# Patient Record
(demographics unavailable — no encounter records)

---

## 2025-06-09 NOTE — ASSESSMENT
[FreeTextEntry1] : Will renew his prescription for Atrovent. Also recommend an office submucosal resection of his inferior turbinate, out fracturing of turbinate and radiofrequency ablation of the bilateral nasal septal swell bodies.  I explained to him that the procedure should help with nasal airway but that he will still require use of Atrovent for his rhinitis.

## 2025-06-09 NOTE — HISTORY OF PRESENT ILLNESS
[de-identified] : Comes in in follow-up reports that use of Atrovent helped him. He continues to complain of bilateral nasal congestion and rhinitis.

## 2025-07-15 NOTE — PROCEDURE
[FreeTextEntry1] : Submucosal resection of inferior turbinate, bilateral, endoscopic -CPT code 54773. [FreeTextEntry2] : Inferior turbinate hypertrophy, vasomotor rhinitis [FreeTextEntry3] : He was prepped and draped in usual fashion.  His nose was aggressively topicalized and decongested.  I then injected approximately 2 cc of 1% lidocaine with 1 100,000 epinephrine into bilateral inferior turbinates. Stab incision was then made. Then using radiofrequency ablation submucosally and inferior turbinate submucosal resection was performed bilaterally. I also injected the bilateral septal swell bodies and applied radiofrequency ablation to that as well.  He tolerated the procedure well. No bleeding. Wound care was discussed. Recommended Afrin for 3 days. Recommended hypetronic saline irrigation. Follow-up in 3 to 4 weeks